# Patient Record
Sex: FEMALE | Race: ASIAN | URBAN - METROPOLITAN AREA
[De-identification: names, ages, dates, MRNs, and addresses within clinical notes are randomized per-mention and may not be internally consistent; named-entity substitution may affect disease eponyms.]

---

## 2017-03-20 ENCOUNTER — OFFICE VISIT (OUTPATIENT)
Dept: FAMILY MEDICINE CLINIC | Facility: CLINIC | Age: 62
End: 2017-03-20

## 2017-03-20 VITALS
RESPIRATION RATE: 16 BRPM | WEIGHT: 153.25 LBS | TEMPERATURE: 98 F | SYSTOLIC BLOOD PRESSURE: 120 MMHG | BODY MASS INDEX: 27.15 KG/M2 | HEIGHT: 63 IN | HEART RATE: 88 BPM | DIASTOLIC BLOOD PRESSURE: 92 MMHG

## 2017-03-20 DIAGNOSIS — Z48.02 VISIT FOR SUTURE REMOVAL: Primary | ICD-10-CM

## 2017-03-20 PROCEDURE — 99202 OFFICE O/P NEW SF 15 MIN: CPT | Performed by: NURSE PRACTITIONER

## 2017-03-20 RX ORDER — ALLOPURINOL 100 MG/1
100 TABLET ORAL DAILY
COMMUNITY

## 2017-03-20 NOTE — PROGRESS NOTES
Pt presented to [facility] for suture removal. Pt slipped, fell and hit her head in the [place] and required 5 sutures to the cap of her head. 5 sutures were removed successfully. Incision was CDI pos scabbing noted, no swelling.  Educated on cleansing and fol

## (undated) NOTE — MR AVS SNAPSHOT
[address]  [address]  [address] [id number]  [phone]               Thank you for choosing us for your health care visit with ISA Garcia.   We are glad to serve you and happy to provide you with this summary of not sign up before the expiration date, you must request a new code. Your unique [employer] Access Code: [email or web address]  Expires: 5/19/2017  4:58 PM    If you have questions, you can call [phone] to talk to our [facility] Staff.  Remember, [employer]